# Patient Record
Sex: MALE | Race: WHITE | NOT HISPANIC OR LATINO | Employment: OTHER | URBAN - METROPOLITAN AREA
[De-identification: names, ages, dates, MRNs, and addresses within clinical notes are randomized per-mention and may not be internally consistent; named-entity substitution may affect disease eponyms.]

---

## 2018-01-17 ENCOUNTER — HOSPITAL ENCOUNTER (OUTPATIENT)
Dept: GENERAL RADIOLOGY | Facility: HOSPITAL | Age: 68
Discharge: HOME | End: 2018-01-17
Attending: FAMILY MEDICINE | Admitting: FAMILY MEDICINE

## 2018-01-17 ENCOUNTER — OFFICE VISIT (OUTPATIENT)
Dept: FAMILY MEDICINE CLINIC | Facility: CLINIC | Age: 68
End: 2018-01-17

## 2018-01-17 ENCOUNTER — TELEPHONE (OUTPATIENT)
Dept: FAMILY MEDICINE CLINIC | Facility: CLINIC | Age: 68
End: 2018-01-17

## 2018-01-17 VITALS
OXYGEN SATURATION: 98 % | BODY MASS INDEX: 29.75 KG/M2 | DIASTOLIC BLOOD PRESSURE: 78 MMHG | WEIGHT: 231.8 LBS | SYSTOLIC BLOOD PRESSURE: 122 MMHG | HEIGHT: 74 IN | HEART RATE: 66 BPM | RESPIRATION RATE: 18 BRPM

## 2018-01-17 DIAGNOSIS — E55.9 VITAMIN D DEFICIENCY: ICD-10-CM

## 2018-01-17 DIAGNOSIS — M54.2 NECK PAIN: ICD-10-CM

## 2018-01-17 DIAGNOSIS — G71.9: ICD-10-CM

## 2018-01-17 DIAGNOSIS — Z12.5 ENCOUNTER FOR SCREENING FOR MALIGNANT NEOPLASM OF PROSTATE: ICD-10-CM

## 2018-01-17 DIAGNOSIS — R79.9 ABNORMAL FINDING OF BLOOD CHEMISTRY: ICD-10-CM

## 2018-01-17 DIAGNOSIS — Z00.00 INITIAL MEDICARE ANNUAL WELLNESS VISIT: Primary | ICD-10-CM

## 2018-01-17 DIAGNOSIS — Z11.4 ENCOUNTER FOR SCREENING FOR HIV: ICD-10-CM

## 2018-01-17 LAB
25(OH)D3 SERPL-MCNC: 22.9 NG/ML
ALBUMIN SERPL-MCNC: 4.6 G/DL (ref 3.2–4.8)
ALBUMIN/GLOB SERPL: 1.6 G/DL (ref 1.5–2.5)
ALP SERPL-CCNC: 67 U/L (ref 25–100)
ALT SERPL W P-5'-P-CCNC: 36 U/L (ref 7–40)
ANION GAP SERPL CALCULATED.3IONS-SCNC: 7 MMOL/L (ref 3–11)
ARTICHOKE IGE QN: 155 MG/DL (ref 0–130)
AST SERPL-CCNC: 28 U/L (ref 0–33)
BASOPHILS # BLD AUTO: 0.03 10*3/MM3 (ref 0–0.2)
BASOPHILS NFR BLD AUTO: 0.8 % (ref 0–1)
BILIRUB BLD-MCNC: NEGATIVE MG/DL
BILIRUB SERPL-MCNC: 0.8 MG/DL (ref 0.3–1.2)
BUN BLD-MCNC: 26 MG/DL (ref 9–23)
BUN/CREAT SERPL: 28.9 (ref 7–25)
CALCIUM SPEC-SCNC: 9.6 MG/DL (ref 8.7–10.4)
CHLORIDE SERPL-SCNC: 105 MMOL/L (ref 99–109)
CHOLEST SERPL-MCNC: 217 MG/DL (ref 0–200)
CLARITY, POC: CLEAR
CO2 SERPL-SCNC: 28 MMOL/L (ref 20–31)
COLOR UR: YELLOW
CREAT BLD-MCNC: 0.9 MG/DL (ref 0.6–1.3)
CRP SERPL-MCNC: 0.01 MG/DL (ref 0–1)
DEPRECATED RDW RBC AUTO: 42 FL (ref 37–54)
EOSINOPHIL # BLD AUTO: 0.12 10*3/MM3 (ref 0–0.3)
EOSINOPHIL NFR BLD AUTO: 3.1 % (ref 0–3)
ERYTHROCYTE [DISTWIDTH] IN BLOOD BY AUTOMATED COUNT: 12.5 % (ref 11.3–14.5)
GFR SERPL CREATININE-BSD FRML MDRD: 84 ML/MIN/1.73
GLOBULIN UR ELPH-MCNC: 2.8 GM/DL
GLUCOSE BLD-MCNC: 99 MG/DL (ref 70–100)
GLUCOSE UR STRIP-MCNC: NEGATIVE MG/DL
HBA1C MFR BLD: 5.6 % (ref 4.8–5.6)
HCT VFR BLD AUTO: 44.6 % (ref 38.9–50.9)
HCV AB SER DONR QL: NORMAL
HDLC SERPL-MCNC: 44 MG/DL (ref 40–60)
HGB BLD-MCNC: 15.3 G/DL (ref 13.1–17.5)
HIV1+2 AB SER QL: NORMAL
IMM GRANULOCYTES # BLD: 0.01 10*3/MM3 (ref 0–0.03)
IMM GRANULOCYTES NFR BLD: 0.3 % (ref 0–0.6)
KETONES UR QL: NEGATIVE
LEUKOCYTE EST, POC: NEGATIVE
LYMPHOCYTES # BLD AUTO: 1.31 10*3/MM3 (ref 0.6–4.8)
LYMPHOCYTES NFR BLD AUTO: 34.2 % (ref 24–44)
MCH RBC QN AUTO: 31.8 PG (ref 27–31)
MCHC RBC AUTO-ENTMCNC: 34.3 G/DL (ref 32–36)
MCV RBC AUTO: 92.7 FL (ref 80–99)
MONOCYTES # BLD AUTO: 0.49 10*3/MM3 (ref 0–1)
MONOCYTES NFR BLD AUTO: 12.8 % (ref 0–12)
NEUTROPHILS # BLD AUTO: 1.87 10*3/MM3 (ref 1.5–8.3)
NEUTROPHILS NFR BLD AUTO: 48.8 % (ref 41–71)
NITRITE UR-MCNC: NEGATIVE MG/ML
PH UR: 5.5 [PH] (ref 5–8)
PLATELET # BLD AUTO: 176 10*3/MM3 (ref 150–450)
PMV BLD AUTO: 10.6 FL (ref 6–12)
POTASSIUM BLD-SCNC: 4.5 MMOL/L (ref 3.5–5.5)
PROT SERPL-MCNC: 7.4 G/DL (ref 5.7–8.2)
PROT UR STRIP-MCNC: NEGATIVE MG/DL
PSA SERPL-MCNC: 0.8 NG/ML (ref 0–4)
RBC # BLD AUTO: 4.81 10*6/MM3 (ref 4.2–5.76)
RBC # UR STRIP: NEGATIVE /UL
SODIUM BLD-SCNC: 140 MMOL/L (ref 132–146)
SP GR UR: 1.02 (ref 1–1.03)
TRIGL SERPL-MCNC: 244 MG/DL (ref 0–150)
TSH SERPL DL<=0.05 MIU/L-ACNC: 3.32 MIU/ML (ref 0.35–5.35)
URATE SERPL-MCNC: 6.7 MG/DL (ref 3.7–9.2)
UROBILINOGEN UR QL: NORMAL
WBC NRBC COR # BLD: 3.83 10*3/MM3 (ref 3.5–10.8)

## 2018-01-17 PROCEDURE — 83036 HEMOGLOBIN GLYCOSYLATED A1C: CPT | Performed by: FAMILY MEDICINE

## 2018-01-17 PROCEDURE — 86803 HEPATITIS C AB TEST: CPT | Performed by: FAMILY MEDICINE

## 2018-01-17 PROCEDURE — 72050 X-RAY EXAM NECK SPINE 4/5VWS: CPT

## 2018-01-17 PROCEDURE — G0438 PPPS, INITIAL VISIT: HCPCS | Performed by: FAMILY MEDICINE

## 2018-01-17 PROCEDURE — G0103 PSA SCREENING: HCPCS | Performed by: FAMILY MEDICINE

## 2018-01-17 PROCEDURE — G0432 EIA HIV-1/HIV-2 SCREEN: HCPCS | Performed by: FAMILY MEDICINE

## 2018-01-17 PROCEDURE — 85025 COMPLETE CBC W/AUTO DIFF WBC: CPT | Performed by: FAMILY MEDICINE

## 2018-01-17 PROCEDURE — 80053 COMPREHEN METABOLIC PANEL: CPT | Performed by: FAMILY MEDICINE

## 2018-01-17 PROCEDURE — 86140 C-REACTIVE PROTEIN: CPT | Performed by: FAMILY MEDICINE

## 2018-01-17 PROCEDURE — 82306 VITAMIN D 25 HYDROXY: CPT | Performed by: FAMILY MEDICINE

## 2018-01-17 PROCEDURE — 80061 LIPID PANEL: CPT | Performed by: FAMILY MEDICINE

## 2018-01-17 PROCEDURE — 84443 ASSAY THYROID STIM HORMONE: CPT | Performed by: FAMILY MEDICINE

## 2018-01-17 PROCEDURE — 81003 URINALYSIS AUTO W/O SCOPE: CPT | Performed by: FAMILY MEDICINE

## 2018-01-17 PROCEDURE — 84550 ASSAY OF BLOOD/URIC ACID: CPT | Performed by: FAMILY MEDICINE

## 2018-01-17 PROCEDURE — 36415 COLL VENOUS BLD VENIPUNCTURE: CPT | Performed by: FAMILY MEDICINE

## 2018-01-17 RX ORDER — DICLOFENAC SODIUM 75 MG/1
75 TABLET, DELAYED RELEASE ORAL
Qty: 60 TABLET | Refills: 0 | Status: SHIPPED | OUTPATIENT
Start: 2018-01-17

## 2018-01-17 RX ORDER — TIZANIDINE 4 MG/1
TABLET ORAL
Qty: 45 TABLET | Refills: 0 | Status: SHIPPED | OUTPATIENT
Start: 2018-01-17 | End: 2018-01-17 | Stop reason: SDUPTHER

## 2018-01-17 RX ORDER — DICLOFENAC SODIUM 75 MG/1
75 TABLET, DELAYED RELEASE ORAL
Qty: 60 TABLET | Refills: 0 | Status: SHIPPED | OUTPATIENT
Start: 2018-01-17 | End: 2018-01-17 | Stop reason: SDUPTHER

## 2018-01-17 RX ORDER — TIZANIDINE 4 MG/1
TABLET ORAL
Qty: 45 TABLET | Refills: 0 | Status: SHIPPED | OUTPATIENT
Start: 2018-01-17

## 2018-01-17 NOTE — PROGRESS NOTES
The ABCs of the Annual Wellness Visit    HEALTH RISK ASSESSMENT  INITIAL Medicare Wellness Visit  1950    Recent Hospitalizations: NONE    Current Medical Providers: Patient Care Team:  Braxton Orr MD as PCP - Family Medicine  Shadiprasanna Norton MD as PCP - Claims Attributed    Smoking Status   History   Smoking Status   • Never Smoker   Smokeless Tobacco   • Never Used     Alcohol Consumption   History   Alcohol Use   • 3.6 oz/week   • 6 Cans of beer per week     Depression Screen   PHQ-2 Depression Screening  Little interest or pleasure in doing things? 0   Feeling down, depressed, or hopeless? 0   PHQ-2 Total Score 0     Fall Risk Assessment  Fallen in past 6 months: 0--> No  Mental Status: 0--> no mental status change  Mobility: 0--> No mobility issues  Medications: 0--> No meds  Total Fall Risk Score: 1    Does the patient have evidence of cognitive impairment? No  Aspirin use counseling: Aspirin 81 mg po once daily advised.    Recent Lab Results:  The patient describes previous care with local [facility].  No physician records available for review (data deficit).  Records requested.    Subjective     History of Present Illness  Frandy Tapia is a 67 y.o. male who presents for an Initial Wellness Visit.  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  He describes lifting hay and various overuse activities.  He recalls no injury or trauma.  He recalls no past history of neck injury.  He denies left upper extremity numbness, tingling or loss of .    Review of Systems   Constitutional: Negative.    HENT: Negative.    Eyes: Negative.    Respiratory: Negative.    Cardiovascular: Negative.  Negative for chest pain, palpitations and leg swelling.   Gastrointestinal: Negative.    Endocrine: Negative.    Genitourinary: Negative.    Musculoskeletal: Positive for neck pain.   Skin: Negative.    Allergic/Immunologic: Negative.    Neurological: Negative.  Negative for tremors,  "weakness and numbness.   Hematological: Negative.    Psychiatric/Behavioral: Negative.      The following portions of the patient's history were reviewed and updated as appropriate: past medical history, past surgical history, allergies, current medications, past family history and social history.      Medication Sig   • loratadine (CLARITIN) 10 MG tablet Take 10 mg by mouth daily.   • pantoprazole (PROTONIX) 40 MG EC tablet Take 40 mg by mouth daily.   • sildenafil (VIAGRA) 100 MG tablet Take 100 mg by mouth daily as needed for erectile dysfunction.   • simvastatin (ZOCOR) 40 MG tablet Take 40 mg by mouth daily     No facility-administered medications prior to visit.        Objective     Visual Acuity    Visual Acuity Screening    Right eye Left eye Both eyes   Without correction:      With correction: 20/20 20/20 20/20     Vitals:    01/17/18 1120   BP: 122/78   Pulse: 66   Resp: 18   SpO2: 98%   Weight: 105 kg (231 lb 12.8 oz)   Height: 188 cm (74\")   PainSc:   4     Body mass index is 29.76 kg/(m^2). Discussed the patient's BMI with him. The BMI is above average; BMI management plan is completed.    Physical Exam   Constitutional: He is oriented to person, place, and time. He appears well-developed and well-nourished. He is cooperative.   HENT:   Head: Normocephalic and atraumatic.   Right Ear: Hearing and external ear normal.   Left Ear: Hearing and external ear normal.   Nose: Nose normal.   Mouth/Throat: Uvula is midline, oropharynx is clear and moist and mucous membranes are normal.   Eyes: Conjunctivae and EOM are normal. Pupils are equal, round, and reactive to light. No scleral icterus.   Neck: Trachea normal. Neck supple. No JVD present. Muscular tenderness present. No spinous process tenderness present. Carotid bruit is not present. Decreased range of motion present. No thyromegaly present.   Cardiovascular: Normal rate, regular rhythm, normal heart sounds and intact distal pulses.    Pulmonary/Chest: " Effort normal and breath sounds normal.   Abdominal: Soft. Bowel sounds are normal. There is no hepatosplenomegaly. There is no tenderness.   Musculoskeletal:        Cervical back: He exhibits decreased range of motion and spasm. He exhibits no bony tenderness.   Lymphadenopathy:     He has no cervical adenopathy.   Neurological: He is alert and oriented to person, place, and time. He has normal strength and normal reflexes. No sensory deficit. Gait normal.   Skin: Skin is warm and dry.   Psychiatric: He has a normal mood and affect. His speech is normal and behavior is normal. Judgment and thought content normal. Cognition and memory are normal.   Nursing note and vitals reviewed.    Compared to one year ago, the patient feels his physical health is the same.  Compared to one year ago, the patient feels his mental health is the same.    Age-appropriate Screening Schedule  Refer to the list below for future screening recommendations based on patient's age, sex and/or medical conditions. Orders for these recommended tests are listed in the plan section. The patient has been provided with a written plan.    Health Maintenance   Topic Date Due   • TDAP/TD VACCINES (1 - Tdap) Addressed   • PNEUMOCOCCAL VACCINES (65+ LOW/MEDIUM RISK) (1 of 2 - PCV13) Addressed   • ZOSTER VACCINE  Addressed   • LIPID PANEL  01/17/2019   • COLONOSCOPY  09/19/2022   • INFLUENZA VACCINE  Completed     Advance Care Planning  has an advance directive - a copy HAS NOT been provided. Have asked the patient to send this to us to add to record.    Identification of Risk Factors  Risk factors include: weight  and cardiovascular risk.    Assessment/Plan   Patient Self-Management and Personalized Health Advice  The patient has been provided with information about: diet, exercise, weight management, prevention of cardiac or vascular disease, the relationship between weight and GERD, fall prevention and designing advance directives and preventive  services including:   · Advance directive, Counseling for cardiovascular disease risk reduction, Diabetes screening, see lab orders, Exercise counseling provided, Fall Risk assessment done, Glaucoma screening recommended, Nutrition counseling provided, Prostate cancer screening discussed.    Visit Diagnoses:    ICD-10-CM ICD-9-CM   1. Initial Medicare annual wellness visit Z00.00 V70.0   2. Neck pain M54.2 723.1   3. Abnormal finding of blood chemistry  R79.9 790.6   4. Primary disorder of muscle  G71.9 359.9   5. Encounter for screening for malignant neoplasm of prostate  Z12.5 V76.44   6. Encounter for screening for HIV  Z11.4 V73.89   7. Vitamin D deficiency  E55.9 268.9       Orders Placed This Encounter   Procedures   • XR Spine Cervical Complete    • Comprehensive Metabolic Panel   • Lipid Panel   • TSH   • Uric Acid   • C-reactive Protein   • PSA Screen   • Hepatitis C Antibody   • HIV-1 / O / 2 Ag / Antibody 4th Generation   • Hemoglobin A1c   • Vitamin D 25 Hydroxy   • CBC Auto Differential   • POC Urinalysis Dipstick, Automated   • CBC & Differential       Current Outpatient Prescriptions:   •  diclofenac (VOLTAREN) 75 MG EC tablet, Take 1 tablet by mouth 2 (Two) Times a Day Before Meals., Disp: 60 tablet, Rfl: 0  •  tiZANidine (ZANAFLEX) 4 MG tablet, Take 1 tab po q hs, Disp: 45 tablet, Rfl: 0    Current outpatient and discharge medications have been reconciled for the patient.  Braxton Orr MD    Reviewed use of high risk medication in the elderly: Not applicable.  Reviewed for potential of harmful drug interactions in the elderly: Not applicable.    Follow Up:  Return in about 1 year (around 1/17/2019), or if symptoms worsen or fail to improve.     An After Visit Summary and PPPS with all of these plans were given to the patient.        Braxton Orr MD 01/17/18 2:29 PM

## 2018-01-20 DIAGNOSIS — M47.22 OSTEOARTHRITIS OF SPINE WITH RADICULOPATHY, CERVICAL REGION: Primary | ICD-10-CM

## 2018-01-20 NOTE — PROGRESS NOTES
Your cervical spine x-rays are abnormal.  I recommend we proceed with MRI imaging.  The ordered has been placed.  Please communicate with my office at [phone] concerning this recommendation.  Thank you.

## 2018-01-20 NOTE — PROGRESS NOTES
Your vitamin D level is low.  Please take vitamin D 2000 IU daily.  Otherwise the rest of your lab results are all satisfactory.  Please follow a low cholesterol diet and continue with daily aerobic activity.  If you have any questions or concerns, please don't hesitate to contact us.  Thank you.

## 2018-01-29 ENCOUNTER — TELEPHONE (OUTPATIENT)
Dept: FAMILY MEDICINE CLINIC | Facility: CLINIC | Age: 68
End: 2018-01-29

## 2018-01-29 NOTE — TELEPHONE ENCOUNTER
"----- Message from Braxton Orr MD sent at 1/24/2018  1:02 PM EST -----  Regarding: RE: XRAY RESULTS  Contact: [phone]  On 1/23/2018 the following was communicated via \"[employer].\"      IMPRESSION:   1. Substantial degenerative disc disease C3-C7 with marked arthropathy   in the uncovertebral recesses and mild osteophyte formation anteriorly   and posteriorly.   2. There is only mild neuroforaminal osteophyte impingement, worse on   the right than left.   3. Otherwise, there is no erosion, fracture, subluxation or other acute   abnormality and the odontoid is intact.     Your cervical spine x-ray report is abnormal.  I recommend further imaging with MRI technology.  That order has been placed.  Please communicate with the office.  Thank you.     Please hold off on Physical Therapy until MRI results complete.    ----- Message -----     From: Yaquelin Jeff MA     Sent: 1/24/2018  12:10 PM       To: Braxton Orr MD  Subject: FW: XRAY RESULTS                                     ----- Message -----     From: Yesi Juan     Sent: 1/23/2018   1:37 PM       To: Yaquelin Jeff MA  Subject: XRAY RESULTS                                     PT HAD AN XRAY ON 1/17/18 AND WOULD LIKE TO KNOW RESULT    "

## 2018-02-07 ENCOUNTER — HOSPITAL ENCOUNTER (OUTPATIENT)
Dept: MRI IMAGING | Facility: HOSPITAL | Age: 68
Discharge: HOME | End: 2018-02-07
Attending: FAMILY MEDICINE | Admitting: FAMILY MEDICINE

## 2018-02-07 DIAGNOSIS — M47.22 OSTEOARTHRITIS OF SPINE WITH RADICULOPATHY, CERVICAL REGION: ICD-10-CM

## 2018-02-07 PROCEDURE — 72141 MRI NECK SPINE W/O DYE: CPT

## 2018-02-10 DIAGNOSIS — R93.7 ABNORMAL MRI, CERVICAL SPINE: ICD-10-CM

## 2018-02-10 DIAGNOSIS — M50.30 DDD (DEGENERATIVE DISC DISEASE), CERVICAL: Primary | ICD-10-CM

## 2018-02-10 NOTE — PROGRESS NOTES
Your MRI report correlates to your reported symptoms.  I recommend a consultation with a neurosurgeon.  Please contact our office and allow us to assist you with the referral process.  Thank you.

## 2018-02-17 ENCOUNTER — OFFICE VISIT (OUTPATIENT)
Dept: NEUROSURGERY | Facility: CLINIC | Age: 68
End: 2018-02-17

## 2018-02-17 VITALS
BODY MASS INDEX: 30.01 KG/M2 | HEIGHT: 74 IN | WEIGHT: 233.8 LBS | DIASTOLIC BLOOD PRESSURE: 84 MMHG | SYSTOLIC BLOOD PRESSURE: 142 MMHG | TEMPERATURE: 99.2 F

## 2018-02-17 DIAGNOSIS — M50.30 DEGENERATIVE DISC DISEASE, CERVICAL: Primary | ICD-10-CM

## 2018-02-17 PROCEDURE — 99203 OFFICE O/P NEW LOW 30 MIN: CPT | Performed by: NEUROLOGICAL SURGERY

## 2018-02-17 NOTE — PROGRESS NOTES
"Frandy Tapia  1950  [id number]      Chief Complaint   Patient presents with   • Neck Pain     headaches       HISTORY OF PRESENT ILLNESS:  This is a 67-year-old who awoke one month ago with severe pain in his neck.  It is worse when he turned his neck toward the left and right..  The pain is axial and nonradicular.  He does not have symptoms of radiculopathy.  Since that time his symptoms have subsided and today he is virtually asymptomatic.  He has noted over the past several years that his bones 10 to \"pop and crack.  This particular episode occurred after he was helping his daughter move and lifting.     Past Medical History:   Diagnosis Date   • ED (erectile dysfunction)    • HLD (hyperlipidemia)    • MORENITA (obstructive sleep apnea)    • Seasonal allergies    • Vegetarian diet        Past Surgical History:   Procedure Laterality Date   • COLONOSCOPY  2017   • LASIK Bilateral 2000   • MYRINGOTOMY W/ TUBES Bilateral    • UPPER GASTROINTESTINAL ENDOSCOPY  2015       Family History   Problem Relation Age of Onset   • Multiple sclerosis Mother    • Pneumonia Mother    • Heart disease Father    • Colon cancer Father        Social History     Social History   • Marital status:      Spouse name: Elizabeth   • Number of children: 2   • Years of education: H.S[employer]     Occupational History   •  Retired     Social History Main Topics   • Smoking status: Never Smoker   • Smokeless tobacco: Never Used   • Alcohol use 3.6 oz/week     6 Cans of beer per week   • Drug use: No   • Sexual activity: Yes     Partners: Female      Comment:      Other Topics Concern   • Not on file     Social History Narrative       Allergies   Allergen Reactions   • Capsaicin Hives   • Sulfa Antibiotics          Current Outpatient Prescriptions:   •  diclofenac (VOLTAREN) 75 MG EC tablet, Take 1 tablet by mouth 2 (Two) Times a Day Before Meals., Disp: 60 tablet, Rfl: 0  •  tiZANidine (ZANAFLEX) 4 MG tablet, Take 1 tab po q hs, " "Disp: 45 tablet, Rfl: 0    Review of Systems   Constitutional: Negative for diaphoresis, fatigue and fever.   Respiratory: Negative for shortness of breath.    Cardiovascular: Negative for chest pain and palpitations.   Gastrointestinal: Negative for abdominal pain, nausea and vomiting.   Musculoskeletal: Positive for neck pain. Negative for back pain.   Neurological: Positive for headaches. Negative for dizziness, weakness and light-headedness.   Psychiatric/Behavioral: Negative for confusion.   All other systems reviewed and are negative.      Vitals:    02/17/18 1104   BP: 142/84   Temp: 99.2 °F (37.3 °C)   Weight: 106 kg (233 lb 12.8 oz)   Height: 188 cm (74\")       Neurological Examination:      Mental status/speech: The patient is alert and oriented.  Speech is clear without aphysia or dysarthria.  No overt cognitive deficits.    Cranial nerve examination:    Olfaction: Smell is intact.  Vision: Vision is intact without visual field abnormalities.  Funduscopic examination is normal.  No pupillary irregularity.  Ocular motor examination: The extraocular muscles are intact.  There is no diplopia.  The pupil is round and reactive to both light and accommodation.  There is no nystagmus.  Facial movement/sensation: There is no facial weakness.  Sensation is intact in the first, second, and third divisions of the trigeminal nerve.  The corneal reflex is intact.  Auditory: Hearing is intact to finger rub bilaterally.  Cranial nerves IX, X, XI, XII: Phonation is normal.  No dysphagia.  Tongue is protruded in the midline without atrophy.  The gag reflex is intact.  Shoulder shrug is normal.    Musculoligamentous ligamentous examination: Crease range of motion of the cervical spine unaccompanied by weakness sensory loss or reflex asymmetry.  His gait is normal.         Medical Decision Making:     Diagnostic Data Set:  Cervical MRI shows reverse of the normal curve, degenerative disc disease I'll spinal stenosis but no " abnormal is now T within the spinal cord       Assessment:  Cervical spondylosis           Recommendations:   He does not have a neurological dysfunction; surgery is not indicated or warranted at this time.  I reviewed his diagnostic studies with him.  I have suggested that he visit a physical therapist for education.  I've also suggested the judicial use of ibuprofen.  He has asymptomatic this time.  He may well have exacerbations in her future but certainly he is not at risk for neurological dysfunction.        I greatly appreciate the opportunity to see and evaluate this individual.  If you have questions or concerns regarding issues that I may have overlooked please call me at any time: [phone].  German Denise M.D.  [facility]  [address]  [place] [address]    Answers for HPI/ROS submitted by the patient on 2/14/2018   Neurologic complaint  altered mental status: No  clumsiness: No  focal sensory loss: No  focal weakness: No  loss of balance: No  memory loss: No  near-syncope: No  slurred speech: No  visual change: No  Chronicity: new  Onset: 1 to 4 weeks ago  Onset quality: gradually  Progression since onset: resolved  Focality: left-sided  auditory change: No  aura: No  bladder incontinence: No  bowel incontinence: No  vertigo: No  Treatments tried: acetaminophen  Improvement on treatment: significant